# Patient Record
Sex: FEMALE | Race: WHITE | NOT HISPANIC OR LATINO | Employment: OTHER | URBAN - METROPOLITAN AREA
[De-identification: names, ages, dates, MRNs, and addresses within clinical notes are randomized per-mention and may not be internally consistent; named-entity substitution may affect disease eponyms.]

---

## 2023-08-21 ENCOUNTER — OFFICE VISIT (OUTPATIENT)
Dept: URGENT CARE | Facility: CLINIC | Age: 73
End: 2023-08-21
Payer: MEDICARE

## 2023-08-21 VITALS
BODY MASS INDEX: 23.04 KG/M2 | SYSTOLIC BLOOD PRESSURE: 104 MMHG | RESPIRATION RATE: 14 BRPM | TEMPERATURE: 99.1 F | HEIGHT: 63 IN | DIASTOLIC BLOOD PRESSURE: 56 MMHG | WEIGHT: 130 LBS | HEART RATE: 78 BPM | OXYGEN SATURATION: 95 %

## 2023-08-21 DIAGNOSIS — N30.01 ACUTE CYSTITIS WITH HEMATURIA: ICD-10-CM

## 2023-08-21 DIAGNOSIS — R81 GLUCOSURIA: ICD-10-CM

## 2023-08-21 LAB
APPEARANCE UR: CLEAR
BILIRUB UR STRIP-MCNC: NEGATIVE MG/DL
COLOR UR AUTO: NORMAL
GLUCOSE UR STRIP.AUTO-MCNC: 100 MG/DL
KETONES UR STRIP.AUTO-MCNC: NEGATIVE MG/DL
LEUKOCYTE ESTERASE UR QL STRIP.AUTO: NORMAL
NITRITE UR QL STRIP.AUTO: POSITIVE
PH UR STRIP.AUTO: 5.5 [PH] (ref 5–8)
PROT UR QL STRIP: 30 MG/DL
RBC UR QL AUTO: NORMAL
SP GR UR STRIP.AUTO: <=1.005
UROBILINOGEN UR STRIP-MCNC: 1 MG/DL

## 2023-08-21 PROCEDURE — 99203 OFFICE O/P NEW LOW 30 MIN: CPT

## 2023-08-21 PROCEDURE — 81002 URINALYSIS NONAUTO W/O SCOPE: CPT

## 2023-08-21 PROCEDURE — 3078F DIAST BP <80 MM HG: CPT

## 2023-08-21 PROCEDURE — 3074F SYST BP LT 130 MM HG: CPT

## 2023-08-21 RX ORDER — NORTRIPTYLINE HYDROCHLORIDE 10 MG/1
10 CAPSULE ORAL
COMMUNITY
Start: 2023-06-02

## 2023-08-21 RX ORDER — HYDROXYZINE HYDROCHLORIDE 25 MG/1
25 TABLET, FILM COATED ORAL
COMMUNITY

## 2023-08-21 RX ORDER — CEFDINIR 300 MG/1
300 CAPSULE ORAL 2 TIMES DAILY
Qty: 14 CAPSULE | Refills: 0 | Status: SHIPPED | OUTPATIENT
Start: 2023-08-21 | End: 2023-08-28

## 2023-08-21 RX ORDER — BECLOMETHASONE DIPROPIONATE HFA 80 UG/1
AEROSOL, METERED RESPIRATORY (INHALATION)
COMMUNITY

## 2023-08-21 RX ORDER — THYROID,PORK 325 MG
325 TABLET ORAL DAILY
COMMUNITY

## 2023-08-21 RX ORDER — CHOLESTYRAMINE 4 G/9G
4 POWDER, FOR SUSPENSION ORAL
COMMUNITY

## 2023-08-21 RX ORDER — CLONAZEPAM 1 MG/1
TABLET ORAL
COMMUNITY
Start: 2023-08-07

## 2023-08-21 RX ORDER — BETA-CAROTENE 7500 MCG
25000 CAPSULE ORAL
COMMUNITY

## 2023-08-21 RX ORDER — DIPHENOXYLATE HYDROCHLORIDE AND ATROPINE SULFATE 2.5; .025 MG/1; MG/1
1 TABLET ORAL
COMMUNITY
Start: 2023-06-09

## 2023-08-21 RX ORDER — PHENAZOPYRIDINE HYDROCHLORIDE 95 MG/1
95 TABLET ORAL
COMMUNITY

## 2023-08-21 RX ORDER — FLUTICASONE PROPIONATE 50 MCG
SPRAY, SUSPENSION (ML) NASAL
COMMUNITY
Start: 2023-08-08

## 2023-08-21 RX ORDER — ESOMEPRAZOLE MAGNESIUM 40 MG/1
40 CAPSULE, DELAYED RELEASE ORAL 2 TIMES DAILY
COMMUNITY

## 2023-08-21 RX ORDER — SUCRALFATE 1 G/1
1 TABLET ORAL 4 TIMES DAILY
COMMUNITY

## 2023-08-21 RX ORDER — PREGABALIN 50 MG/1
CAPSULE ORAL
COMMUNITY
Start: 2023-06-28

## 2023-08-21 RX ORDER — PHENOL 1.4 %
10 AEROSOL, SPRAY (ML) MUCOUS MEMBRANE DAILY
COMMUNITY
Start: 2023-04-26

## 2023-08-21 RX ORDER — FLECAINIDE ACETATE 100 MG/1
100 TABLET ORAL
COMMUNITY
Start: 2023-05-09

## 2023-08-21 RX ORDER — PHENOBARBITAL, HYOSCYAMINE SULFATE, ATROPINE SULFATE, SCOPOLAMINE HYDROBROMIDE 16.2; .1037; .0194; .0065 MG/1; MG/1; MG/1; MG/1
1 TABLET ORAL EVERY 6 HOURS PRN
COMMUNITY
Start: 2023-06-07

## 2023-08-21 ASSESSMENT — FIBROSIS 4 INDEX: FIB4 SCORE: 0.67

## 2023-08-22 NOTE — PROGRESS NOTES
Subjective:   Ara Cabello is a 73 y.o. female who presents for UTI (X 3 days)      HPI:    Patient presents to urgent care with concerns of UTI  Reports dysuria, urinary frequency and urgency for 3 days.  States pain has increased rapidly.  Denies fever, chills.  Denies flank or back pain  Denies nausea/vomiting. Tolerating solids and fluids.  Endorses slight hematuria.    Endorses suprapubic pain.  Denies malodorous urine.  Denies vaginal discharge.  No recent UTI or antibiotic use.  Denies concern for STDs. Denies rash.   Mild improvement with increased oral hydration and heating pads.  She is in town visiting family, she is from [facility] As above in HPI    Medications:    Current Outpatient Medications on File Prior to Visit   Medication Sig Dispense Refill    METOPROLOL SUCCINATE PO Take  by mouth.      beclomethasone HFA (QVAR REDIHALER) 80 MCG/ACT inhaler       beta carotene 85165 UNIT capsule Take 25,000 Units by mouth.      cholestyramine (QUESTRAN) 4 g packet Take 4 g by mouth.      Thyroid (NATURE-THROID) 325 MG Tab Take 325 mg by mouth every day.      sucralfate (CARAFATE) 1 GM Tab Take 1 Tablet by mouth 4 times a day.      riFAXIMin (XIFAXAN) 550 MG Tab tablet Take 550 mg by mouth.      pregabalin (LYRICA) 50 MG capsule TAKE 1 TO 2 CAPSULES BY MOUTH AT BEDTIME      phenazopyridine (PYRIDIUM) 95 MG tablet Take 95 mg by mouth.      PB-Hyoscy-Atropine-Scopolamine (PHENOBARBITAL-BELLADONNA ALK) 16.2 MG Tab Take 1 Tablet by mouth every 6 hours as needed.      nortriptyline (PAMELOR) 10 MG Cap Take 10 mg by mouth at bedtime.      Melatonin 10 MG Tab Take 10 mg by mouth every day.      hydrOXYzine HCl (ATARAX) 25 MG Tab Take 25 mg by mouth.      fluticasone (FLONASE) 50 MCG/ACT nasal spray ADMINISTER 1 SPRAY TO INTO EACH NOSTRIL DAILY      flecainide (TAMBOCOR) 100 MG Tab Take 100 mg by mouth.      esomeprazole (NEXIUM) 40 MG delayed-release capsule Take 40 mg by mouth 2 times a day.       "diphenoxylate-atropine (LOMOTIL) 2.5-0.025 MG Tab Take 1 Tablet by mouth.      clonazePAM (KLONOPIN) 1 MG Tab TAKE 1 TABLET (1 MG TOTAL) BY MOUTH EVERY BEDTIME       No current facility-administered medications on file prior to visit.        Allergies:   Patient has no allergy information on record.    Problem List:   There is no problem list on file for this patient.       Surgical History:  No past surgical history on file.    Past Social Hx:           Problem list, medications, and allergies reviewed by myself today in Epic.     Objective:     /56   Pulse 78   Temp 37.3 °C (99.1 °F)   Resp 14   Ht 1.6 m (5' 3\")   Wt 59 kg (130 lb)   SpO2 95%   BMI 23.03 kg/m²     Physical Exam  Vitals and nursing note reviewed.   Constitutional:       Appearance: Normal appearance.   HENT:      Head: Normocephalic and atraumatic.   Cardiovascular:      Rate and Rhythm: Normal rate and regular rhythm.      Heart sounds: Normal heart sounds.   Pulmonary:      Effort: Pulmonary effort is normal.      Breath sounds: Normal breath sounds.   Abdominal:      General: Bowel sounds are normal. There is no distension.      Palpations: Abdomen is soft.      Tenderness: There is no abdominal tenderness. There is no right CVA tenderness, left CVA tenderness, guarding or rebound.   Genitourinary:     Comments:  examination deferred  Skin:     General: Skin is warm and dry.      Findings: No rash.   Neurological:      Mental Status: She is alert and oriented to person, place, and time.         Assessment/Plan:       Results for orders placed or performed in visit on 08/21/23   POCT Urinalysis   Result Value Ref Range    POC Color orange Negative    POC Appearance clear Negative    POC Glucose 100 Negative mg/dL    POC Bilirubin negative Negative mg/dL    POC Ketones negative Negative mg/dL    POC Specific Gravity <=1.005 <1.005 - >1.030    POC Blood moderate Negative    POC Urine PH 5.5 5.0 - 8.0    POC Protein 30 Negative mg/dL "    POC Urobiligen 1.0 Negative (0.2) mg/dL    POC Nitrites positive Negative    POC Leukocyte Esterase large Negative       Diagnosis and associated orders:   1. Acute cystitis with hematuria  - cefdinir (OMNICEF) 300 MG Cap; Take 1 Capsule by mouth 2 times a day for 7 days.  Dispense: 14 Capsule; Refill: 0  - POCT Urinalysis    2. Glucosuria         Comments/MDM:     UA: +blood, +nitrites, +leukocytes, +glucose, +protein  Rx: Cefdinir  Recommend: increased oral hydration, Scheduled Tylenol, heating pads.  Follow up with PCP regarding glucose and protein in urine when she returns to [place].  Return to UC if symptoms do not improve       Please note that this dictation was created using voice recognition software. I have made a reasonable attempt to correct obvious errors, but I expect that there are errors of grammar and possibly content that I did not discover before finalizing the note.    This note was electronically signed by Tosha Raza, DNP, APRN, FNP-C